# Patient Record
Sex: FEMALE | Race: BLACK OR AFRICAN AMERICAN | URBAN - METROPOLITAN AREA
[De-identification: names, ages, dates, MRNs, and addresses within clinical notes are randomized per-mention and may not be internally consistent; named-entity substitution may affect disease eponyms.]

---

## 2024-10-16 ENCOUNTER — APPOINTMENT (RX ONLY)
Dept: URBAN - METROPOLITAN AREA CLINIC 45 | Facility: CLINIC | Age: 34
Setting detail: DERMATOLOGY
End: 2024-10-16

## 2024-10-16 VITALS — SYSTOLIC BLOOD PRESSURE: 169 MMHG | DIASTOLIC BLOOD PRESSURE: 85 MMHG

## 2024-10-16 DIAGNOSIS — L70.0 ACNE VULGARIS: ICD-10-CM | Status: INADEQUATELY CONTROLLED

## 2024-10-16 DIAGNOSIS — L20.89 OTHER ATOPIC DERMATITIS: ICD-10-CM | Status: INADEQUATELY CONTROLLED

## 2024-10-16 DIAGNOSIS — L40.8 OTHER PSORIASIS: ICD-10-CM | Status: INADEQUATELY CONTROLLED

## 2024-10-16 PROCEDURE — 99204 OFFICE O/P NEW MOD 45 MIN: CPT

## 2024-10-16 PROCEDURE — ? TREATMENT REGIMEN

## 2024-10-16 PROCEDURE — ? COUNSELING

## 2024-10-16 PROCEDURE — ? PRESCRIPTION

## 2024-10-16 PROCEDURE — ? PHOTO-DOCUMENTATION

## 2024-10-16 PROCEDURE — ? MDM - TREATMENT GOALS

## 2024-10-16 RX ORDER — FLUOCINOLONE ACETONIDE 0.11 MG/ML
OIL TOPICAL QHS
Qty: 1 | Refills: 2 | Status: ERX | COMMUNITY
Start: 2024-10-16

## 2024-10-16 RX ORDER — CLOBETASOL PROPIONATE 0.5 MG/ML
SOLUTION TOPICAL
Qty: 50 | Refills: 4 | Status: ERX | COMMUNITY
Start: 2024-10-16

## 2024-10-16 RX ORDER — MOMETASONE FUROATE 1 MG/G
OINTMENT TOPICAL BID
Qty: 45 | Refills: 2 | Status: ERX | COMMUNITY
Start: 2024-10-16

## 2024-10-16 RX ORDER — TRETIONIN 0.25 MG/G
CREAM TOPICAL QHS
Qty: 20 | Refills: 3 | Status: ERX | COMMUNITY
Start: 2024-10-16

## 2024-10-16 RX ORDER — SPIRONOLACTONE 50 MG/1
TABLET, FILM COATED ORAL
Qty: 60 | Refills: 3 | Status: ERX | COMMUNITY
Start: 2024-10-16

## 2024-10-16 RX ORDER — DAPSONE 50 MG/G
GEL TOPICAL
Qty: 60 | Refills: 4 | Status: ERX | COMMUNITY
Start: 2024-10-16

## 2024-10-16 RX ADMIN — CLOBETASOL PROPIONATE: 0.5 SOLUTION TOPICAL at 00:00

## 2024-10-16 RX ADMIN — SPIRONOLACTONE: 50 TABLET, FILM COATED ORAL at 00:00

## 2024-10-16 RX ADMIN — FLUOCINOLONE ACETONIDE: 0.11 OIL TOPICAL at 00:00

## 2024-10-16 RX ADMIN — DAPSONE: 50 GEL TOPICAL at 00:00

## 2024-10-16 RX ADMIN — MOMETASONE FUROATE: 1 OINTMENT TOPICAL at 00:00

## 2024-10-16 RX ADMIN — TRETIONIN: 0.25 CREAM TOPICAL at 00:00

## 2024-10-16 ASSESSMENT — LOCATION ZONE DERM
LOCATION ZONE: FACE
LOCATION ZONE: SCALP
LOCATION ZONE: LEG

## 2024-10-16 ASSESSMENT — LOCATION DETAILED DESCRIPTION DERM
LOCATION DETAILED: LEFT INFERIOR CENTRAL MALAR CHEEK
LOCATION DETAILED: RIGHT DISTAL CALF
LOCATION DETAILED: RIGHT CENTRAL MALAR CHEEK
LOCATION DETAILED: LEFT SUPERIOR MEDIAL FOREHEAD
LOCATION DETAILED: POSTERIOR MID-PARIETAL SCALP

## 2024-10-16 ASSESSMENT — LOCATION SIMPLE DESCRIPTION DERM
LOCATION SIMPLE: LEFT FOREHEAD
LOCATION SIMPLE: POSTERIOR SCALP
LOCATION SIMPLE: LEFT CHEEK
LOCATION SIMPLE: RIGHT CHEEK
LOCATION SIMPLE: RIGHT CALF

## 2024-10-16 ASSESSMENT — BSA PSORIASIS: % BODY COVERED IN PSORIASIS: 6

## 2024-10-16 ASSESSMENT — BSA RASH: BSA RASH: 4

## 2024-10-16 NOTE — HPI: ECZEMA (PATIENT REPORTED)
Where Is Your Eczema Located?: Legs
Additional Comments (Use Complete Sentences): Patient doesn’t use anything and has never tried steroid creams.

## 2024-10-16 NOTE — PROCEDURE: COUNSELING
Azelaic Acid Counseling: Patient counseled that medicine may cause skin irritation and to avoid applying near the eyes.  In the event of skin irritation, the patient was advised to reduce the amount of the drug applied or use it less frequently.   The patient verbalized understanding of the proper use and possible adverse effects of azelaic acid.  All of the patient's questions and concerns were addressed.
Detail Level: Zone
Erythromycin Counseling:  I discussed with the patient the risks of erythromycin including but not limited to GI upset, allergic reaction, drug rash, diarrhea, increase in liver enzymes, and yeast infections.
Isotretinoin Counseling: Patient should get monthly blood tests, not donate blood, not drive at night if vision affected, not share medication, and not undergo elective surgery for 6 months after tx completed. Side effects reviewed, pt to contact office should one occur.
Bactrim Pregnancy And Lactation Text: This medication is Pregnancy Category D and is known to cause fetal risk.  It is also excreted in breast milk.
Tazorac Pregnancy And Lactation Text: This medication is not safe during pregnancy. It is unknown if this medication is excreted in breast milk.
High Dose Vitamin A Counseling: Side effects reviewed, pt to contact office should one occur.
Spironolactone Pregnancy And Lactation Text: This medication can cause feminization of the male fetus and should be avoided during pregnancy. The active metabolite is also found in breast milk.
Azithromycin Pregnancy And Lactation Text: This medication is considered safe during pregnancy and is also secreted in breast milk.
Aklief counseling:  Patient advised to apply a pea-sized amount only at bedtime and wait 30 minutes after washing their face before applying.  If too drying, patient may add a non-comedogenic moisturizer.  The most commonly reported side effects including irritation, redness, scaling, dryness, stinging, burning, itching, and increased risk of sunburn.  The patient verbalized understanding of the proper use and possible adverse effects of retinoids.  All of the patient's questions and concerns were addressed.
Winlevi Counseling:  I discussed with the patient the risks of topical clascoterone including but not limited to erythema, scaling, itching, and stinging. Patient voiced their understanding.
Topical Retinoid Pregnancy And Lactation Text: This medication is Pregnancy Category C. It is unknown if this medication is excreted in breast milk.
Doxycycline Counseling:  Patient counseled regarding possible photosensitivity and increased risk for sunburn.  Patient instructed to avoid sunlight, if possible.  When exposed to sunlight, patients should wear protective clothing, sunglasses, and sunscreen.  The patient was instructed to call the office immediately if the following severe adverse effects occur:  hearing changes, easy bruising/bleeding, severe headache, or vision changes.  The patient verbalized understanding of the proper use and possible adverse effects of doxycycline.  All of the patient's questions and concerns were addressed.
Minocycline Counseling: Patient advised regarding possible photosensitivity and discoloration of the teeth, skin, lips, tongue and gums.  Patient instructed to avoid sunlight, if possible.  When exposed to sunlight, patients should wear protective clothing, sunglasses, and sunscreen.  The patient was instructed to call the office immediately if the following severe adverse effects occur:  hearing changes, easy bruising/bleeding, severe headache, or vision changes.  The patient verbalized understanding of the proper use and possible adverse effects of minocycline.  All of the patient's questions and concerns were addressed.
Dapsone Counseling: I discussed with the patient the risks of dapsone including but not limited to hemolytic anemia, agranulocytosis, rashes, methemoglobinemia, kidney failure, peripheral neuropathy, headaches, GI upset, and liver toxicity.  Patients who start dapsone require monitoring including baseline LFTs and weekly CBCs for the first month, then every month thereafter.  The patient verbalized understanding of the proper use and possible adverse effects of dapsone.  All of the patient's questions and concerns were addressed.
Benzoyl Peroxide Pregnancy And Lactation Text: This medication is Pregnancy Category C. It is unknown if benzoyl peroxide is excreted in breast milk.
Sarecycline Counseling: Patient advised regarding possible photosensitivity and discoloration of the teeth, skin, lips, tongue and gums.  Patient instructed to avoid sunlight, if possible.  When exposed to sunlight, patients should wear protective clothing, sunglasses, and sunscreen.  The patient was instructed to call the office immediately if the following severe adverse effects occur:  hearing changes, easy bruising/bleeding, severe headache, or vision changes.  The patient verbalized understanding of the proper use and possible adverse effects of sarecycline.  All of the patient's questions and concerns were addressed.
Topical Sulfur Applications Counseling: Topical Sulfur Counseling: Patient counseled that this medication may cause skin irritation or allergic reactions.  In the event of skin irritation, the patient was advised to reduce the amount of the drug applied or use it less frequently.   The patient verbalized understanding of the proper use and possible adverse effects of topical sulfur application.  All of the patient's questions and concerns were addressed.
Erythromycin Pregnancy And Lactation Text: This medication is Pregnancy Category B and is considered safe during pregnancy. It is also excreted in breast milk.
Azelaic Acid Pregnancy And Lactation Text: This medication is considered safe during pregnancy and breast feeding.
Topical Clindamycin Counseling: Patient counseled that this medication may cause skin irritation or allergic reactions.  In the event of skin irritation, the patient was advised to reduce the amount of the drug applied or use it less frequently.   The patient verbalized understanding of the proper use and possible adverse effects of clindamycin.  All of the patient's questions and concerns were addressed.
Birth Control Pills Counseling: Birth Control Pill Counseling: I discussed with the patient the potential side effects of OCPs including but not limited to increased risk of stroke, heart attack, thrombophlebitis, deep venous thrombosis, hepatic adenomas, breast changes, GI upset, headaches, and depression.  The patient verbalized understanding of the proper use and possible adverse effects of OCPs. All of the patient's questions and concerns were addressed.
Isotretinoin Pregnancy And Lactation Text: This medication is Pregnancy Category X and is considered extremely dangerous during pregnancy. It is unknown if it is excreted in breast milk.
Spironolactone Counseling: Patient advised regarding risks of diarrhea, abdominal pain, hyperkalemia, birth defects (for female patients), liver toxicity and renal toxicity. The patient may need blood work to monitor liver and kidney function and potassium levels while on therapy. The patient verbalized understanding of the proper use and possible adverse effects of spironolactone.  All of the patient's questions and concerns were addressed.
Aklief Pregnancy And Lactation Text: It is unknown if this medication is safe to use during pregnancy.  It is unknown if this medication is excreted in breast milk.  Breastfeeding women should use the topical cream on the smallest area of the skin for the shortest time needed while breastfeeding.  Do not apply to nipple and areola.
Use Enhanced Medication Counseling?: No
Tetracycline Pregnancy And Lactation Text: This medication is Pregnancy Category D and not consider safe during pregnancy. It is also excreted in breast milk.
Winlevi Pregnancy And Lactation Text: This medication is considered safe during pregnancy and breastfeeding.
Tazorac Counseling:  Patient advised that medication is irritating and drying.  Patient may need to apply sparingly and wash off after an hour before eventually leaving it on overnight.  The patient verbalized understanding of the proper use and possible adverse effects of tazorac.  All of the patient's questions and concerns were addressed.
Bactrim Counseling:  I discussed with the patient the risks of sulfa antibiotics including but not limited to GI upset, allergic reaction, drug rash, diarrhea, dizziness, photosensitivity, and yeast infections.  Rarely, more serious reactions can occur including but not limited to aplastic anemia, agranulocytosis, methemoglobinemia, blood dyscrasias, liver or kidney failure, lung infiltrates or desquamative/blistering drug rashes.
High Dose Vitamin A Pregnancy And Lactation Text: High dose vitamin A therapy is contraindicated during pregnancy and breast feeding.
Tetracycline Counseling: Patient counseled regarding possible photosensitivity and increased risk for sunburn.  Patient instructed to avoid sunlight, if possible.  When exposed to sunlight, patients should wear protective clothing, sunglasses, and sunscreen.  The patient was instructed to call the office immediately if the following severe adverse effects occur:  hearing changes, easy bruising/bleeding, severe headache, or vision changes.  The patient verbalized understanding of the proper use and possible adverse effects of tetracycline.  All of the patient's questions and concerns were addressed. Patient understands to avoid pregnancy while on therapy due to potential birth defects.
Doxycycline Pregnancy And Lactation Text: This medication is Pregnancy Category D and not consider safe during pregnancy. It is also excreted in breast milk but is considered safe for shorter treatment courses.
Topical Retinoid counseling:  Patient advised to apply a pea-sized amount only at bedtime and wait 30 minutes after washing their face before applying.  If too drying, patient may add a non-comedogenic moisturizer. The patient verbalized understanding of the proper use and possible adverse effects of retinoids.  All of the patient's questions and concerns were addressed.
Azithromycin Counseling:  I discussed with the patient the risks of azithromycin including but not limited to GI upset, allergic reaction, drug rash, diarrhea, and yeast infections.
Dapsone Pregnancy And Lactation Text: This medication is Pregnancy Category C and is not considered safe during pregnancy or breast feeding.
Topical Sulfur Applications Pregnancy And Lactation Text: This medication is Pregnancy Category C and has an unknown safety profile during pregnancy. It is unknown if this topical medication is excreted in breast milk.
Benzoyl Peroxide Counseling: Patient counseled that medicine may cause skin irritation and bleach clothing.  In the event of skin irritation, the patient was advised to reduce the amount of the drug applied or use it less frequently.   The patient verbalized understanding of the proper use and possible adverse effects of benzoyl peroxide.  All of the patient's questions and concerns were addressed.
Topical Clindamycin Pregnancy And Lactation Text: This medication is Pregnancy Category B and is considered safe during pregnancy. It is unknown if it is excreted in breast milk.
Birth Control Pills Pregnancy And Lactation Text: This medication should be avoided if pregnant and for the first 30 days post-partum.

## 2024-10-16 NOTE — HPI: RASH (SEBORRHEIC DERMATITIS)
Is This A New Presentation, Or A Follow-Up?: Rash
Additional History: Patient scalp has become very irritated and sore.

## 2024-10-16 NOTE — HPI: ACNE (PATIENT REPORTED)
Where Is Your Acne Located?: Face
Additional Comments (Use Complete Sentences): Patient uses otc products but still has bad acne and discoloration. She is currently on Nextplanon and has irregular periods.

## 2025-02-12 ENCOUNTER — APPOINTMENT (OUTPATIENT)
Dept: URBAN - METROPOLITAN AREA CLINIC 45 | Facility: CLINIC | Age: 35
Setting detail: DERMATOLOGY
End: 2025-02-12

## 2025-02-12 ENCOUNTER — RX ONLY (RX ONLY)
Age: 35
End: 2025-02-12

## 2025-02-12 DIAGNOSIS — L70.0 ACNE VULGARIS: ICD-10-CM

## 2025-02-12 DIAGNOSIS — L40.8 OTHER PSORIASIS: ICD-10-CM | Status: IMPROVED

## 2025-02-12 DIAGNOSIS — L20.89 OTHER ATOPIC DERMATITIS: ICD-10-CM | Status: WELL CONTROLLED

## 2025-02-12 PROCEDURE — ? TREATMENT REGIMEN

## 2025-02-12 PROCEDURE — ? PRESCRIPTION

## 2025-02-12 PROCEDURE — 99214 OFFICE O/P EST MOD 30 MIN: CPT

## 2025-02-12 PROCEDURE — ? MDM - TREATMENT GOALS

## 2025-02-12 PROCEDURE — ? PRESCRIPTION MEDICATION MANAGEMENT

## 2025-02-12 PROCEDURE — ? COUNSELING

## 2025-02-12 PROCEDURE — ? PHOTO-DOCUMENTATION

## 2025-02-12 RX ORDER — FLUOCINOLONE ACETONIDE 0.11 MG/ML
OIL TOPICAL QHS
Qty: 1 | Refills: 2 | Status: CANCELLED

## 2025-02-12 RX ORDER — CLOBETASOL PROPIONATE 0.5 MG/ML
SOLUTION TOPICAL
Qty: 50 | Refills: 11 | Status: ERX

## 2025-02-12 RX ORDER — DAPSONE 50 MG/G
GEL TOPICAL
Qty: 60 | Refills: 11 | Status: ERX

## 2025-02-12 RX ORDER — DAPSONE 50 MG/G
GEL TOPICAL
Qty: 60 | Refills: 4 | Status: CANCELLED

## 2025-02-12 RX ORDER — FLUOCINOLONE ACETONIDE 0.11 MG/ML
OIL TOPICAL QHS
Qty: 1 | Refills: 11 | Status: ERX

## 2025-02-12 RX ORDER — SPIRONOLACTONE 50 MG/1
TABLET, FILM COATED ORAL
Qty: 90 | Refills: 5 | Status: ERX

## 2025-02-12 RX ORDER — TRETIONIN 0.5 MG/G
CREAM TOPICAL
Qty: 45 | Refills: 5 | Status: ERX | COMMUNITY
Start: 2025-02-12

## 2025-02-12 RX ORDER — SPIRONOLACTONE 50 MG/1
TABLET, FILM COATED ORAL
Qty: 60 | Refills: 3 | Status: CANCELLED

## 2025-02-12 RX ADMIN — TRETIONIN: 0.5 CREAM TOPICAL at 00:00

## 2025-02-12 ASSESSMENT — LOCATION SIMPLE DESCRIPTION DERM
LOCATION SIMPLE: POSTERIOR SCALP
LOCATION SIMPLE: LEFT CHEEK
LOCATION SIMPLE: LEFT FOREHEAD
LOCATION SIMPLE: RIGHT CALF
LOCATION SIMPLE: RIGHT CHEEK

## 2025-02-12 ASSESSMENT — LOCATION DETAILED DESCRIPTION DERM
LOCATION DETAILED: POSTERIOR MID-PARIETAL SCALP
LOCATION DETAILED: RIGHT CENTRAL MALAR CHEEK
LOCATION DETAILED: RIGHT DISTAL CALF
LOCATION DETAILED: LEFT INFERIOR CENTRAL MALAR CHEEK
LOCATION DETAILED: LEFT SUPERIOR MEDIAL FOREHEAD

## 2025-02-12 ASSESSMENT — LOCATION ZONE DERM
LOCATION ZONE: SCALP
LOCATION ZONE: FACE
LOCATION ZONE: LEG

## 2025-02-12 NOTE — PROCEDURE: PRESCRIPTION MEDICATION MANAGEMENT
Render In Strict Bullet Format?: No
Detail Level: Zone
Continue Regimen: Derma-Smoothe/FS Scalp Oil 0.01 % Qhs\\nQuantity: 1.0 Kit  Days Supply: 30\\nSig: Apply qhs to scalp, leave on overnight and wash out\\n\\nclobetasol 0.05 % scalp solution \\nQuantity: 50.0 ml  Days Supply: 30\\nSig: Apply to scalp twice daily x 1 month, then prn for flares
Continue Regimen: mometasone 0.1 % topical ointment Bid PRN
Initiate Treatment: spironolactone 50 mg tablet \\nQuantity: 90.0 Tablet  Days Supply: 30\\nSig: Take 2 pill po in the morning and take 1 pill po at night\\n\\ntretinoin 0.05 % topical cream \\nQuantity: 45.0 g  Days Supply: 30\\nSig: Apply pea size amount to aa on face QOHS work way up to QHS as tolerated
Continue Regimen: dapsone 5 % topical gel \\nQuantity: 60.0 g  Days Supply: 30\\nSig: Apply to affected area on face qam